# Patient Record
Sex: MALE | Race: WHITE | Employment: FULL TIME | URBAN - METROPOLITAN AREA
[De-identification: names, ages, dates, MRNs, and addresses within clinical notes are randomized per-mention and may not be internally consistent; named-entity substitution may affect disease eponyms.]

---

## 2024-01-12 ENCOUNTER — OFFICE VISIT (OUTPATIENT)
Dept: ORTHOPEDIC SURGERY | Age: 64
End: 2024-01-12

## 2024-01-12 VITALS — BODY MASS INDEX: 25.9 KG/M2 | WEIGHT: 185 LBS | HEIGHT: 71 IN

## 2024-01-12 DIAGNOSIS — M25.511 RIGHT SHOULDER PAIN, UNSPECIFIED CHRONICITY: Primary | ICD-10-CM

## 2024-01-12 DIAGNOSIS — M25.811 SHOULDER IMPINGEMENT, RIGHT: ICD-10-CM

## 2024-01-12 RX ORDER — LIDOCAINE HYDROCHLORIDE 10 MG/ML
5 INJECTION, SOLUTION INFILTRATION; PERINEURAL ONCE
Status: COMPLETED | OUTPATIENT
Start: 2024-01-12 | End: 2024-01-12

## 2024-01-12 RX ORDER — BUPIVACAINE HYDROCHLORIDE 2.5 MG/ML
30 INJECTION, SOLUTION INFILTRATION; PERINEURAL ONCE
Status: COMPLETED | OUTPATIENT
Start: 2024-01-12 | End: 2024-01-12

## 2024-01-12 RX ORDER — TRIAMCINOLONE ACETONIDE 40 MG/ML
40 INJECTION, SUSPENSION INTRA-ARTICULAR; INTRAMUSCULAR ONCE
Status: COMPLETED | OUTPATIENT
Start: 2024-01-12 | End: 2024-01-12

## 2024-01-12 RX ADMIN — BUPIVACAINE HYDROCHLORIDE 75 MG: 2.5 INJECTION, SOLUTION INFILTRATION; PERINEURAL at 09:32

## 2024-01-12 RX ADMIN — LIDOCAINE HYDROCHLORIDE 5 ML: 10 INJECTION, SOLUTION INFILTRATION; PERINEURAL at 09:33

## 2024-01-12 RX ADMIN — TRIAMCINOLONE ACETONIDE 40 MG: 40 INJECTION, SUSPENSION INTRA-ARTICULAR; INTRAMUSCULAR at 09:33

## 2024-01-12 NOTE — PROGRESS NOTES
Dr Joe Elliott      Date /Time 1/12/2024             9:29 AM EST  Name Dylon Lee             1960   [facility]  MRN [id number]                Chief Complaint   Patient presents with    Shoulder Pain     N Right Shoulder         History of Present Illness    Dylon Lee is a 63 y.o. male who presents with  right Shoulder pain.    Sent in consultation by Unknown, Provider, DO, .      Occupation:   with heavy lifting  Occupational activities: heavy lifting.  Athletic/exercise activity: Weightlifting.  Injury Mechanism:  none.  Worker's Comp. & legal issues:   none.  Previous Treatments: Ice, Heat, and NSAIDs    Patient presents the office today for new problem.  Patient here with a chief complaint of right shoulder pain.  Patient's right shoulder has been painful for a couple of months.  No specific injury or trauma.  Pain mostly concentrated lateral shoulder.  Increased pain and weakness with overhead activities.  He has had a previous arthroscopic surgery in 95 for impingement without rotator cuff tear.    Past Medical History  No past medical history on file.  Past Surgical History:   Procedure Laterality Date    KNEE SURGERY      SHOULDER SURGERY       Social History     Tobacco Use    Smoking status: Never    Smokeless tobacco: Not on file   Substance Use Topics    Alcohol use: Not on file      Current Outpatient Medications on File Prior to Visit   Medication Sig Dispense Refill    ibuprofen (ADVIL;MOTRIN) 200 MG tablet Take 600 mg by mouth every 6 hours as needed for Pain.       No current facility-administered medications on file prior to visit.        ASCVD 10-YEAR RISK SCORE  The ASCVD Risk score (Gamal MANNING, et al., 2019) failed to calculate for the following reasons:    The systolic blood pressure is missing    Cannot find a previous HDL lab    Cannot find a previous total cholesterol lab     Review of Systems  10-point ROS is negative other than 
Opt out

## 2024-01-17 ENCOUNTER — HOSPITAL ENCOUNTER (OUTPATIENT)
Dept: PHYSICAL THERAPY | Age: 64
Setting detail: THERAPIES SERIES
Discharge: HOME | End: 2024-01-17
Attending: ORTHOPAEDIC SURGERY
Payer: COMMERCIAL

## 2024-01-17 PROCEDURE — 97110 THERAPEUTIC EXERCISES: CPT

## 2024-01-17 PROCEDURE — 97140 MANUAL THERAPY 1/> REGIONS: CPT

## 2024-01-17 PROCEDURE — 97161 PT EVAL LOW COMPLEX 20 MIN: CPT

## 2024-01-17 NOTE — THERAPY EVALUATION
None Assessed   [] Following tests noted:    Emanuel Marquez (impingement): Positive on R  Speed's test (LHB): Positive on R  Lateral Anne test: Positive on R     Balance:  [x] WNL      [] NT       [] Dysfunction noted  Comment:     Falls Risk Assessment (30 days):   Falls Risk assessed and no intervention required.  Time Up and Go (TUG):   Not Assessed     ASSESSMENT   Assessment:   Dylon Lee is a 63 y.o. male presenting today to Outpatient PT with signs and symptoms consistent with R shoulder impingement. Patient displays decreased ROM with Elbow ER and IR at 90 degrees, and decreases in R global shoulder strength. Patient is TTP at R posterolateral shoulder. Patient also dispalys tightness in R pectorals and shoulder external rotators.   Patient presents with the functional impairments and activity limitations listed below and would benefit from continued Outpatient PT to address the below impairments as well as improve pain, and restore function.     Functional Impairments:   Decreased UE functional ROM  Decreased UE functional strength  Decreased RC/scapular/core strength and neuromuscular control    Functional Activity Limitations (from functional questionnaire and intake):  Any of your usual work, housework, or school activities and Pushing up on your hands (eg, from bathtub or chair)    Participation Restrictions:   Reduced participation in work activities  Reduced participation in social activities  Reduced participation in sports/recreation    Classification :   Signs/symptoms consistent with shoulder impingement          Barriers to/and or personal factors that will affect rehab potential:   None noted    Prognosis/Rehab Potential:  [] Excellent     [x] Good     [] Fair     [] Poor         Tolerance of evaluation/treatment:   [] Excellent     [x] Good     [] Fair     [] Poor    Physical Therapy Evaluation Complexity Justification  [x] A history of present problem and no personal factors and/or

## 2024-01-17 NOTE — FLOWSHEET NOTE
ER, IR, and ABD)  15                          Modalities:    No modalities applied this session    Education/Home Exercise Program: Patient HEP program created electronically.  Refer to [employer] access code:    [email or web address]       ASSESSMENT     Today's Assessment: See Eval    Medical Necessity Documentation:  I certify that this patient meets the below criteria necessary for medical necessity for care and/or justification of therapy services:  The patient has functional impairments and/or activity limitations and would benefit from continued outpatient therapy services to address the deficits outlined in the patients goals    Treatment/Activity Tolerance:  [x] Patient tolerated treatment well [] Patient limited by fatique  [] Patient limited by pain  [] Patient limited by other medical complications  [] Other:     Return to Play: NA    Prognosis for POC: [x] Good [] Fair  [] Poor    Patient requires continued skilled intervention: [x] Yes  [] No      GOALS      Patient stated goal: 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   Status: [] Progressing: [] Met: [x] Not Met: [] Adjusted     Therapist goals for Patient:   Short Term Goals: To be achieved in: 2 weeks  Independent in HEP and progression per patient tolerance, in order to progress toward full function and prevent re-injury.               Status: [] Progressing: [] Met: [x] Not Met: [] Adjusted  Patient will have a decrease in pain to 2/10 to help  facilitate improvement in movement, function, and ADLs as indicated by functional deficits.              Status: [] Progressing: [] Met: [x] Not Met: [] Adjusted     Long Term Goals: To be achieved in: 4-6weeks  Improve ROM to WNL to allow for proper joint functioning as indicated by patients functional deficits.  Status: [] Progressing: [] Met: [x] Not Met: [] Adjusted  Pt to improve strength to 4+/5 or better of rotator cuff and scapular retractors to allow for proper muscle and joint use in

## 2024-01-23 ENCOUNTER — HOSPITAL ENCOUNTER (OUTPATIENT)
Dept: PHYSICAL THERAPY | Age: 64
Setting detail: THERAPIES SERIES
Discharge: HOME | End: 2024-01-23
Attending: ORTHOPAEDIC SURGERY
Payer: COMMERCIAL

## 2024-01-23 PROCEDURE — 97140 MANUAL THERAPY 1/> REGIONS: CPT

## 2024-01-23 PROCEDURE — 97110 THERAPEUTIC EXERCISES: CPT

## 2024-01-30 ENCOUNTER — HOSPITAL ENCOUNTER (OUTPATIENT)
Dept: PHYSICAL THERAPY | Age: 64
Setting detail: THERAPIES SERIES
Discharge: HOME | End: 2024-01-30
Attending: ORTHOPAEDIC SURGERY
Payer: COMMERCIAL

## 2024-01-30 PROCEDURE — 97140 MANUAL THERAPY 1/> REGIONS: CPT

## 2024-01-30 PROCEDURE — 97110 THERAPEUTIC EXERCISES: CPT

## 2024-01-30 NOTE — FLOWSHEET NOTE
[] Met: [x] Not Met: [] Adjusted  Patient will have a decrease in pain to 2/10 to help  facilitate improvement in movement, function, and ADLs as indicated by functional deficits.              Status: [] Progressing: [] Met: [x] Not Met: [] Adjusted     Long Term Goals: To be achieved in: 4-6weeks  Improve ROM to WNL to allow for proper joint functioning as indicated by patients functional deficits.  Status: [] Progressing: [] Met: [x] Not Met: [] Adjusted  Pt to improve strength to 4+/5 or better of rotator cuff and scapular retractors to allow for proper muscle and joint use in functional mobility, ADLs and prior level of function   Status: [] Progressing: [] Met: [x] Not Met: [] Adjusted  Patient will return to Reaching activities and Lifting without increased symptoms or restriction to work towards return to prior level of function.                                  Status: [] Progressing: [] Met: [x] Not Met: [] Adjusted  Patient will increase UE function to allow independence in all self-care activities.                                                                                                           Status: [] Progressing: [] Met: [x] Not Met: [] Adjusted    Overall Progression Towards Functional goals/ Treatment Progress Update:  [] Patient is progressing as expected towards functional goals listed.    [] Progression is slowed due to complexities/Impairments listed.  [] Progression has been slowed due to co-morbidities.  [x] Plan just implemented, too soon (<30days) to assess goals progression   [] Goals require adjustment due to lack of progress  [] Patient is not progressing as expected and requires additional follow up with physician  [] Other:     CHARGE CAPTURE     PT CHARGE GRID   CPT Code (TIMED) minutes # CPT Code (UNTIMED) #     Therex (57258)  30 2  EVAL:LOW (20289 - Typically 20 minutes face-to-face)     Neuromusc. Re-ed (37455)    Re-Eval (73960)     Manual (10592) 10 1  Estim Unattended

## 2024-02-06 ENCOUNTER — HOSPITAL ENCOUNTER (OUTPATIENT)
Dept: PHYSICAL THERAPY | Age: 64
Setting detail: THERAPIES SERIES
Discharge: HOME | End: 2024-02-06
Attending: ORTHOPAEDIC SURGERY
Payer: COMMERCIAL

## 2024-02-06 PROCEDURE — 97140 MANUAL THERAPY 1/> REGIONS: CPT

## 2024-02-06 PROCEDURE — 97110 THERAPEUTIC EXERCISES: CPT

## 2024-02-06 NOTE — FLOWSHEET NOTE
[facility]- Outpatient Rehabilitation and Therapy [address], [place] [address] office: [phone] fax: [phone]      Physical Therapy: TREATMENT/PROGRESS NOTE   Patient: Dylon Lee (63 y.o. male)   Treatment Date: 2024   :  1960 MRN: [id number]   Visit #: 4   Insurance Allowable Auth Needed   50 []Yes    [x]No    Insurance: Payor: AETNA / Plan: AETNA / Product Type: *No Product type* /   Insurance ID: [id number] - (Commercial)  Secondary Insurance (if applicable):    Treatment Diagnosis: M25.511  R Shoulder pain   Medical Diagnosis:    Shoulder impingement, right [M25.811]   Referring Physician: Joe Elliott MD  PCP: Unknown, Provider, DO                             Plan of care signed (Y/N):     Date of Patient follow up with Physician:      Progress Report/POC:   POC update due: (10 visits /OR AUTH LIMITS, whichever is less) 2024     (Cut and paste Precautions/Contraindications from Eval)  Latex allergy:  NO  Pacemaker:    NO  Contraindications for Manipulation: None       Preferred Language for Healthcare:   [x]English       []other:    SUBJECTIVE EXAMINATION     Patient Report/Comments: Pt states his shoulder feels about the same today, and he states he feels like his is at a plateau in terms of his shoulder getting better.      Test used Initial score  2024   Pain Summary VAS 3/10    Functional questionnaire UEFI 54/80    Other:                OBJECTIVE EXAMINATION     Observation: Patient displays decreased AROM of R shoulder.     Test measurements: see eval    Exercises/Interventions:     Therapeutic Ex (44521) / NMR re-education (93593) Sets/time Notes/Cues/Progressions   AAROM shoulder ER  and flex with cane in supine  3x10 each     Shoulder ABD,ER,Ext,Flex isometric at wall  2x10 with 3 second hold     Shoulder ER/IR  2x10 each  GTB, pt cued on sequencing    Prone I,Y,T  3x10 each  Pt cued on sequencing, and to complete with

## 2024-02-14 ENCOUNTER — HOSPITAL ENCOUNTER (OUTPATIENT)
Dept: PHYSICAL THERAPY | Age: 64
Setting detail: THERAPIES SERIES
Discharge: HOME | End: 2024-02-14
Attending: ORTHOPAEDIC SURGERY
Payer: COMMERCIAL

## 2024-02-14 PROCEDURE — 97110 THERAPEUTIC EXERCISES: CPT

## 2024-02-14 PROCEDURE — 20560 NDL INSJ W/O NJX 1 OR 2 MUSC: CPT

## 2024-02-14 PROCEDURE — 97140 MANUAL THERAPY 1/> REGIONS: CPT

## 2024-02-14 NOTE — FLOWSHEET NOTE
[facility]- Outpatient Rehabilitation and Therapy [address], [place] [address] office: [phone] fax: [phone]      Physical Therapy: TREATMENT/PROGRESS NOTE   Patient: Dylon Lee (63 y.o. male)   Treatment Date: 2024   :  1960 MRN: [id number]   Visit #: 5   Insurance Allowable Auth Needed   50 []Yes    [x]No    Insurance: Payor: AETNA / Plan: AETNA / Product Type: *No Product type* /   Insurance ID: [id number] - (Commercial)  Secondary Insurance (if applicable):    Treatment Diagnosis: M25.511  R Shoulder pain   Medical Diagnosis:    Shoulder impingement, right [M25.811]   Referring Physician: Joe Elliott MD  PCP: Unknown, Provider, DO                             Plan of care signed (Y/N):     Date of Patient follow up with Physician:      Progress Report/POC:   POC update due: (10 visits /OR AUTH LIMITS, whichever is less) 2024     (Cut and paste Precautions/Contraindications from Eval)  Latex allergy:  NO  Pacemaker:    NO  Contraindications for Manipulation: None       Preferred Language for Healthcare:   [x]English       []other:    SUBJECTIVE EXAMINATION     Patient Report/Comments: Pt states his shoulder feels about the same today, and he states he feels like his is at a plateau in terms of his shoulder getting better.      Test used Initial score  2024   Pain Summary VAS 3/10    Functional questionnaire UEFI 54/80    Other:                OBJECTIVE EXAMINATION     Observation: Patient displays decreased AROM of R shoulder.     Test measurements: see eval    Exercises/Interventions:     Therapeutic Ex (80728) / NMR re-education (14826) Sets/time Notes/Cues/Progressions   AAROM shoulder ER  and flex with cane in supine  3x10 each     Shoulder ABD,ER,Ext isometric at wall  2x10 with 3 second hold     2x10 each  GTB, pt cued on sequencing    3x10 each  Pt cued on sequencing, and to complete with thumb up for Is and Ys   2x10  GTB,

## 2024-02-22 ENCOUNTER — HOSPITAL ENCOUNTER (OUTPATIENT)
Dept: PHYSICAL THERAPY | Age: 64
Setting detail: THERAPIES SERIES
Discharge: HOME | End: 2024-02-22
Attending: ORTHOPAEDIC SURGERY
Payer: COMMERCIAL

## 2024-02-22 PROCEDURE — 20560 NDL INSJ W/O NJX 1 OR 2 MUSC: CPT

## 2024-02-22 PROCEDURE — 97110 THERAPEUTIC EXERCISES: CPT

## 2024-02-22 PROCEDURE — 97140 MANUAL THERAPY 1/> REGIONS: CPT

## 2024-02-22 NOTE — FLOWSHEET NOTE
[facility]- Outpatient Rehabilitation and Therapy [address], [address], [place] [address] office: [phone] fax: [phone]      Physical Therapy: TREATMENT/PROGRESS NOTE   Patient: Dylon Lee (63 y.o. male)   Treatment Date: 2024   :  1960 MRN: [id number]   Visit #: 6   Insurance Allowable Auth Needed   50 []Yes    [x]No    Insurance: Payor: AETNA / Plan: AETNA / Product Type: *No Product type* /   Insurance ID: [id number] - (Commercial)  Secondary Insurance (if applicable):    Treatment Diagnosis: M25.511  R Shoulder pain   Medical Diagnosis:    Shoulder impingement, right [M25.811]   Referring Physician: Joe Elliott MD  PCP: Unknown, Provider, DO                             Plan of care signed (Y/N):     Date of Patient follow up with Physician:      Progress Report/POC:   POC update due: (10 visits /OR AUTH LIMITS, whichever is less) 2024     (Cut and paste Precautions/Contraindications from Eval)  Latex allergy:  NO  Pacemaker:    NO  Contraindications for Manipulation: None       Preferred Language for Healthcare:   [x]English       []other:    SUBJECTIVE EXAMINATION     Patient Report/Comments: Pt reports the dry needling helped him feel relief for two days, but his pain returned to previous level after that.      Test used Initial score  2024   Pain Summary VAS 3/10    Functional questionnaire UEFI 54/80    Other:                OBJECTIVE EXAMINATION     Observation: Patient displays decreased AROM of R shoulder.     Test measurements: see eval    Exercises/Interventions:     Therapeutic Ex (84965) / NMR re-education (67450) Sets/time Notes/Cues/Progressions   3x10 each     2x10 with 3 second hold     2x10 each  GTB, pt cued on sequencing    3x10 each  Pt cued on sequencing, and to complete with thumb up for Is and Ys   2x10  GTB, pt cued to keep shoulder relaxed   30x each  2# ball    10x with 3 sec hold    2x10  10# DB        Pt ED: POC,

## 2024-02-29 ENCOUNTER — TELEPHONE (OUTPATIENT)
Dept: ORTHOPEDIC SURGERY | Age: 64
End: 2024-02-29

## 2024-02-29 ENCOUNTER — OFFICE VISIT (OUTPATIENT)
Dept: ORTHOPEDIC SURGERY | Age: 64
End: 2024-02-29
Payer: COMMERCIAL

## 2024-02-29 VITALS — BODY MASS INDEX: 25.9 KG/M2 | HEIGHT: 71 IN | WEIGHT: 185 LBS

## 2024-02-29 DIAGNOSIS — M25.811 SHOULDER IMPINGEMENT, RIGHT: Primary | ICD-10-CM

## 2024-02-29 DIAGNOSIS — M25.511 RIGHT SHOULDER PAIN, UNSPECIFIED CHRONICITY: ICD-10-CM

## 2024-02-29 PROCEDURE — 99213 OFFICE O/P EST LOW 20 MIN: CPT | Performed by: ORTHOPAEDIC SURGERY

## 2024-02-29 RX ORDER — UBIDECARENONE 100 MG
200 CAPSULE ORAL DAILY
COMMUNITY

## 2024-02-29 RX ORDER — SILDENAFIL CITRATE 20 MG/1
TABLET ORAL
COMMUNITY
Start: 2023-12-21

## 2024-02-29 RX ORDER — ROSUVASTATIN CALCIUM 20 MG/1
20 TABLET, COATED ORAL EVERY EVENING
COMMUNITY
Start: 2024-01-26 | End: 2024-07-24

## 2024-02-29 NOTE — TELEPHONE ENCOUNTER
MRI RIGHT SHOULDER no precert required due to [facility] and [facility] are both out of network for this patient.   Called Provider Services and spoke with Naya and she was not able to come up with any facilities that are in network for this patient to have MRI done at.  Patient does have out of network benefits of 55% covered after deductible has been met.  So far $0 has been met of his $1,600.00 deductible.     If patient is able to find a facility that is in network we can try and precert it for there.  Patient would need to call his insurance and see if he can find a facility in network.       Maxine spoke with patient - (Reason above)     Patient to contact insurance company and get back  to me       Patient Called back- Per Insurance the ONLY [facility] in Network is [facility]           Updated  03/01/2024 - Only approved Facility is [facility]                                                        Patient states he has it in writing [facility] = patient forwarding email    I will share with the PA Team       03/01/2024 Updated 11:07 am   MRI RIGHT SHOULDER approved Auth# [id number]     Was approved for [facility] Jeff Mendoza   Valid 3/1/2024 - 8/28/2024     (Gretchen parada [facility] said it is a glitch in their system and that they are aware and working on it.  She even said when she saw it was [facility] that it was in network.)           murali

## 2024-02-29 NOTE — PROGRESS NOTES
[]Not tested    Instability Signs  [] All Neg  [] Not tested  [] All Neg  [] Not tested   General laxity (thumb/elbow)  []  []Not tested   []  []Not tested    Hyperabduction  []  []Not tested   []  []Not tested    Sulcus []Side   []ER    []Side   []ER       Anterior apprehension  []  []Not tested   []  []Not tested    Relocation  []   []Not tested  []  []Not tested     Imaging  Right Shoulder: [facility]  Previous Radiographs: X-rays were ordered and reviewed of the right shoulder.  3 views.  AP, scapular Y, and axillary views.  They demonstrate no evidence of fractures or dislocations with well-maintained joint space.        Procedure:  No orders of the defined types were placed in this encounter.        Assessment and Plan  Dylon was seen today for shoulder pain.    Diagnoses and all orders for this visit:    Shoulder impingement, right    Right shoulder pain, unspecified chronicity          Patient is suffering with right shoulder impingement and rotator cuff tendinitis.  The injection and physical therapy did not help.  Patient will be ordered an MRI to evaluate for full-thickness rotator cuff tear.  Follow-up in office after MRI    I discussed with Dylon Rosa that his history, symptoms, signs, and imaging are most consistent with shoulder impingement.    We reviewed the natural history of these conditions and treatment options ranging from conservative measures (rest, icing, activity modification, physical therapy, pain meds, cortisone injection) to surgical options.     In terms of treatment, I recommended continuing with rest, icing, avoidance of painful activities, NSAIDs or pain meds as tolerated, and physical therapy.     If these are not effective, cortisone injection can be considered.  We discussed surgical options as well, should conservative measures fail.     Electronically signed by Joe Elliott MD on 2/29/2024 at 9:17 AM  This dictation was generated by voice recognition computer

## 2024-03-01 ENCOUNTER — TELEPHONE (OUTPATIENT)
Dept: ORTHOPEDIC SURGERY | Age: 64
End: 2024-03-01

## 2024-03-01 NOTE — TELEPHONE ENCOUNTER
MRI RIGHT SHOULDER approved Auth# [id number]     Was approved for [facility] Jeff Mendoza   Valid 3/1/2024 - 8/28/2024     [facility]/ Patient/ Follow up in Office to review     jm

## 2024-03-12 ENCOUNTER — OFFICE VISIT (OUTPATIENT)
Dept: ORTHOPEDIC SURGERY | Age: 64
End: 2024-03-12

## 2024-03-12 VITALS — HEIGHT: 71 IN | BODY MASS INDEX: 25.9 KG/M2 | WEIGHT: 185 LBS

## 2024-03-12 DIAGNOSIS — M75.01 ADHESIVE CAPSULITIS OF RIGHT SHOULDER: Primary | ICD-10-CM

## 2024-03-12 RX ORDER — LIDOCAINE HYDROCHLORIDE 10 MG/ML
5 INJECTION, SOLUTION INFILTRATION; PERINEURAL ONCE
Status: COMPLETED | OUTPATIENT
Start: 2024-03-12 | End: 2024-03-12

## 2024-03-12 RX ORDER — BUPIVACAINE HYDROCHLORIDE 2.5 MG/ML
30 INJECTION, SOLUTION INFILTRATION; PERINEURAL ONCE
Status: COMPLETED | OUTPATIENT
Start: 2024-03-12 | End: 2024-03-12

## 2024-03-12 RX ORDER — TRIAMCINOLONE ACETONIDE 40 MG/ML
40 INJECTION, SUSPENSION INTRA-ARTICULAR; INTRAMUSCULAR ONCE
Status: COMPLETED | OUTPATIENT
Start: 2024-03-12 | End: 2024-03-12

## 2024-03-12 RX ADMIN — LIDOCAINE HYDROCHLORIDE 5 ML: 10 INJECTION, SOLUTION INFILTRATION; PERINEURAL at 10:19

## 2024-03-12 RX ADMIN — BUPIVACAINE HYDROCHLORIDE 75 MG: 2.5 INJECTION, SOLUTION INFILTRATION; PERINEURAL at 10:19

## 2024-03-12 RX ADMIN — TRIAMCINOLONE ACETONIDE 40 MG: 40 INJECTION, SUSPENSION INTRA-ARTICULAR; INTRAMUSCULAR at 10:20

## 2024-03-12 NOTE — PROGRESS NOTES
bicipital tenodesis.  He wishes to wait until after the summer.  We will perform an intra-articular cortisone injection today and he will continue with his physical therapy exercises    I discussed with Dylon Lee that his history, symptoms, signs, and imaging are most consistent with shoulder impingement.    We reviewed the natural history of these conditions and treatment options ranging from conservative measures (rest, icing, activity modification, physical therapy, pain meds, cortisone injection) to surgical options.     In terms of treatment, I recommended continuing with rest, icing, avoidance of painful activities, NSAIDs or pain meds as tolerated, and physical therapy.     If these are not effective, cortisone injection can be considered.  We discussed surgical options as well, should conservative measures fail.     Electronically signed by Joe Elliott MD on 2/29/2024 at 9:17 AM  This dictation was generated by voice recognition computer software.  Although all attempts are made to edit the dictation for accuracy, there may be errors in the transcription that are not intended.

## 2024-05-28 ENCOUNTER — TELEPHONE (OUTPATIENT)
Dept: ORTHOPEDIC SURGERY | Age: 64
End: 2024-05-28

## 2024-05-28 ENCOUNTER — OFFICE VISIT (OUTPATIENT)
Dept: ORTHOPEDIC SURGERY | Age: 64
End: 2024-05-28
Payer: COMMERCIAL

## 2024-05-28 VITALS — WEIGHT: 185 LBS | BODY MASS INDEX: 25.9 KG/M2 | HEIGHT: 71 IN

## 2024-05-28 DIAGNOSIS — M25.512 LEFT SHOULDER PAIN, UNSPECIFIED CHRONICITY: Primary | ICD-10-CM

## 2024-05-28 DIAGNOSIS — M75.22 BICIPITAL TENDINITIS OF LEFT SHOULDER: ICD-10-CM

## 2024-05-28 DIAGNOSIS — M25.812 SHOULDER IMPINGEMENT, LEFT: ICD-10-CM

## 2024-05-28 PROCEDURE — 20610 DRAIN/INJ JOINT/BURSA W/O US: CPT | Performed by: ORTHOPAEDIC SURGERY

## 2024-05-28 PROCEDURE — 99213 OFFICE O/P EST LOW 20 MIN: CPT | Performed by: ORTHOPAEDIC SURGERY

## 2024-05-28 RX ORDER — BUPIVACAINE HYDROCHLORIDE 2.5 MG/ML
30 INJECTION, SOLUTION INFILTRATION; PERINEURAL ONCE
Status: COMPLETED | OUTPATIENT
Start: 2024-05-28 | End: 2024-05-28

## 2024-05-28 RX ORDER — LIDOCAINE HYDROCHLORIDE 10 MG/ML
5 INJECTION, SOLUTION INFILTRATION; PERINEURAL ONCE
Status: COMPLETED | OUTPATIENT
Start: 2024-05-28 | End: 2024-05-28

## 2024-05-28 RX ORDER — TRIAMCINOLONE ACETONIDE 40 MG/ML
40 INJECTION, SUSPENSION INTRA-ARTICULAR; INTRAMUSCULAR ONCE
Status: COMPLETED | OUTPATIENT
Start: 2024-05-28 | End: 2024-05-28

## 2024-05-28 RX ADMIN — BUPIVACAINE HYDROCHLORIDE 75 MG: 2.5 INJECTION, SOLUTION INFILTRATION; PERINEURAL at 09:59

## 2024-05-28 RX ADMIN — TRIAMCINOLONE ACETONIDE 40 MG: 40 INJECTION, SUSPENSION INTRA-ARTICULAR; INTRAMUSCULAR at 10:00

## 2024-05-28 RX ADMIN — LIDOCAINE HYDROCHLORIDE 5 ML: 10 INJECTION, SOLUTION INFILTRATION; PERINEURAL at 10:00

## 2024-05-28 NOTE — PROGRESS NOTES
(Gamal MANNING, et al., 2019) failed to calculate for the following reasons:    The systolic blood pressure is missing    Cannot find a previous HDL lab    Cannot find a previous total cholesterol lab     Review of Systems  10-point ROS is negative other than HPI.    Physical Exam  Based off 1997 Exam Criteria  Ht 1.803 m (5' 11\")   Wt 83.9 kg (185 lb)   BMI 25.80 kg/m²      Constitutional:       General: He is not in acute distress.     Appearance: Normal appearance.   Cardiovascular:      Rate and Rhythm: Normal rate and regular rhythm.      Pulses: Normal pulses.   Pulmonary:      Effort: Pulmonary effort is normal. No respiratory distress.   Neurological:      Mental Status: He is alert and oriented to person, place, and time. Mental status is at baseline.   Skin: Clean dry and intact.  No open wounds or sores  Lymphatics: No palpable lymph node    Musculoskeletal:  Gait:  normal    Cervical Spine / Shoulder:      RIGHT  LEFT    Cervical Spine Exam  [x] All Neg    [x] All Neg     Spurling's  []  []Not tested   []  []Not tested    Kaye's  []  []Not tested   []  []Not tested    Pain with rotation  []  []Not tested   []  []Not tested    Pain with lateral bending  []  []Not tested   []  []Not tested    Paraspinal muscle tenderness  [] Paraspinal  []Midline   [] Paraspinal  []Not tested    Sensation RIGHT  LEFT    Axillary  [x] Normal []Decreased    [x] Normal []Decreased   Musculocutaneous  [x] Normal  []Decreased   [x] Normal []Decreased   Median  [x] Normal []Decreased   [x] Normal []Decreased   Radial  [x] Normal  []Decreased   [x] Normal []Decreased   Ulnar  [x] Normal  []Decreased   [x] Normal []Decreased   Scapula       Position  [x]Nml  []low  [] lateral  [x]Nml  []low  [] lateral   Dyskinesia  []+ []Abn.Shrug   []+ []Abn.Shrug                     Winging     [x]None   []Med  []Lat   []Worse w/FE  []Med  []Lat  []Worse w/FE   Scapulothoracic Compress.   []Impr Pain  []Impr Motion  []Impr Pain []Impr Motion

## 2024-06-20 ENCOUNTER — OFFICE VISIT (OUTPATIENT)
Dept: ORTHOPEDIC SURGERY | Age: 64
End: 2024-06-20

## 2024-06-20 VITALS — WEIGHT: 185 LBS | BODY MASS INDEX: 25.9 KG/M2 | HEIGHT: 71 IN

## 2024-06-20 DIAGNOSIS — M75.01 ADHESIVE CAPSULITIS OF RIGHT SHOULDER: Primary | ICD-10-CM

## 2024-06-20 RX ORDER — TRIAMCINOLONE ACETONIDE 40 MG/ML
40 INJECTION, SUSPENSION INTRA-ARTICULAR; INTRAMUSCULAR ONCE
Status: COMPLETED | OUTPATIENT
Start: 2024-06-20 | End: 2024-06-20

## 2024-06-20 RX ORDER — BUPIVACAINE HYDROCHLORIDE 2.5 MG/ML
30 INJECTION, SOLUTION INFILTRATION; PERINEURAL ONCE
Status: COMPLETED | OUTPATIENT
Start: 2024-06-20 | End: 2024-06-20

## 2024-06-20 RX ORDER — LIDOCAINE HYDROCHLORIDE 10 MG/ML
5 INJECTION, SOLUTION INFILTRATION; PERINEURAL ONCE
Status: COMPLETED | OUTPATIENT
Start: 2024-06-20 | End: 2024-06-20

## 2024-06-20 RX ADMIN — TRIAMCINOLONE ACETONIDE 40 MG: 40 INJECTION, SUSPENSION INTRA-ARTICULAR; INTRAMUSCULAR at 08:14

## 2024-06-20 RX ADMIN — LIDOCAINE HYDROCHLORIDE 5 ML: 10 INJECTION, SOLUTION INFILTRATION; PERINEURAL at 08:14

## 2024-06-20 RX ADMIN — BUPIVACAINE HYDROCHLORIDE 75 MG: 2.5 INJECTION, SOLUTION INFILTRATION; PERINEURAL at 08:12

## 2024-06-20 NOTE — PROGRESS NOTES
Dr Joe Elliott      Date /Time 2/29/2024             9:29 AM EST  Name Dylon Lee             1960   Location  [facility]  MRN [id number]                Chief Complaint   Patient presents with    Shoulder Pain     CK RIGHT SHOULDER - CORTISONE 1/12/24        History of Present Illness    Dylon Lee is a 63 y.o. male who presents with  right Shoulder pain.      Occupation:   with heavy lifting  Occupational activities: heavy lifting.  Athletic/exercise activity: Weightlifting.  Injury Mechanism:  none.  Worker's Comp. & legal issues:   none.  Previous Treatments: Ice, Heat, and NSAIDs    Patient here for follow-up visit.  Patient here concerning right shoulder.  Patient did receive a cortisone injection intra-articular in February.  Patient being treated for combination of rotator cuff tendinitis, impingement, and frozen shoulder.  He is also started complaining of all over pain and may have more of an autoimmune disorder going on.  He has an upcoming appointment with his primary care physician to discuss it.    Previous history: Patient presents the office today for a follow-up visit.  Patient being treated for right shoulder impingement and rotator cuff tendinitis.  Patient was given a subacromial cortisone injection and physical therapy.  He has not done well.  His pain is continued.  He was on the states he got approximately 50-60% relief with therapy and of the subacromial cortisone injection.  We did order him an MRI at his last visit.  He does better when taking Advil and Aleve.      Previous history: Patient presents the office today for new problem.  Patient here with a chief complaint of right shoulder pain.  Patient's right shoulder has been painful for a couple of months.  No specific injury or trauma.  Pain mostly concentrated lateral shoulder.  Increased pain and weakness with overhead activities.  He has had a previous arthroscopic surgery in 95 for impingement

## 2024-10-22 ENCOUNTER — OFFICE VISIT (OUTPATIENT)
Dept: ORTHOPEDIC SURGERY | Age: 64
End: 2024-10-22
Payer: COMMERCIAL

## 2024-10-22 VITALS — HEIGHT: 71 IN | BODY MASS INDEX: 25.9 KG/M2 | WEIGHT: 185 LBS

## 2024-10-22 DIAGNOSIS — M75.22 BICIPITAL TENDINITIS OF LEFT SHOULDER: ICD-10-CM

## 2024-10-22 DIAGNOSIS — M25.812 SHOULDER IMPINGEMENT, LEFT: ICD-10-CM

## 2024-10-22 DIAGNOSIS — M25.512 LEFT SHOULDER PAIN, UNSPECIFIED CHRONICITY: Primary | ICD-10-CM

## 2024-10-22 PROCEDURE — 99213 OFFICE O/P EST LOW 20 MIN: CPT | Performed by: PHYSICIAN ASSISTANT

## 2024-10-22 PROCEDURE — 20610 DRAIN/INJ JOINT/BURSA W/O US: CPT | Performed by: PHYSICIAN ASSISTANT

## 2024-10-22 RX ORDER — TRIAMCINOLONE ACETONIDE 40 MG/ML
40 INJECTION, SUSPENSION INTRA-ARTICULAR; INTRAMUSCULAR ONCE
Status: COMPLETED | OUTPATIENT
Start: 2024-10-22 | End: 2024-10-22

## 2024-10-22 RX ORDER — BUPIVACAINE HYDROCHLORIDE 2.5 MG/ML
30 INJECTION, SOLUTION INFILTRATION; PERINEURAL ONCE
Status: COMPLETED | OUTPATIENT
Start: 2024-10-22 | End: 2024-10-22

## 2024-10-22 RX ORDER — LIDOCAINE HYDROCHLORIDE 10 MG/ML
5 INJECTION, SOLUTION INFILTRATION; PERINEURAL ONCE
Status: COMPLETED | OUTPATIENT
Start: 2024-10-22 | End: 2024-10-22

## 2024-10-22 RX ADMIN — TRIAMCINOLONE ACETONIDE 40 MG: 40 INJECTION, SUSPENSION INTRA-ARTICULAR; INTRAMUSCULAR at 14:48

## 2024-10-22 RX ADMIN — LIDOCAINE HYDROCHLORIDE 5 ML: 10 INJECTION, SOLUTION INFILTRATION; PERINEURAL at 14:47

## 2024-10-22 RX ADMIN — BUPIVACAINE HYDROCHLORIDE 75 MG: 2.5 INJECTION, SOLUTION INFILTRATION; PERINEURAL at 14:47

## 2024-10-22 NOTE — PROGRESS NOTES
Dr Joe Elliott      Date /Time 10/22/2024             9:35 AM EDT  Name Dylon Lee             1960   Location  [facility]  MRN [id number]                Chief Complaint   Patient presents with    Follow-up     Ck Left Shoulder (Cortisone 05/28/2024)         History of Present Illness    Dylon Lee is a 64 y.o. male who presents with  left Shoulder pain.    Sent in consultation by Unknown, Provider, .      Injury Mechanism:  none.  Worker's Comp. & legal issues:   none.  Previous Treatments: Ice, Heat, and NSAIDs    Patient presents to the office today for follow-up visit.  Patient being treated for left shoulder impingement and bicipital tendinitis.  Patient did receive an intra-articular cortisone injection almost 5 months ago with good relief until recently.  His pain has returned.  No new injury or trauma.  Pain concentrated anterior and lateral.  I did ask him to see a rheumatologist at his last visit due to multiple joint pain and he has not seen one yet.    Previous history: Patient presents to the office today for a new problem.  Patient is here with a chief complaint of left shoulder pain.  We have seen him for his right shoulder in the past and states that the left shoulder feels similar.  The left shoulder is painful mostly anterior and lateral.  Increased pain with overhead activities.  No specific injury or trauma.    In terms of the right shoulder did present fairly similar it first.  The subacromial injection really was not that helpful and intra-articular injection was more beneficial to him    Past Medical History  History reviewed. No pertinent past medical history.  Past Surgical History:   Procedure Laterality Date    KNEE SURGERY      SHOULDER SURGERY       Social History     Tobacco Use    Smoking status: Never    Smokeless tobacco: Not on file   Substance Use Topics    Alcohol use: Not on file      Current Outpatient Medications on File Prior to Visit   Medication Sig

## 2024-11-25 ENCOUNTER — OFFICE VISIT (OUTPATIENT)
Dept: ORTHOPEDIC SURGERY | Age: 64
End: 2024-11-25

## 2024-11-25 ENCOUNTER — TELEPHONE (OUTPATIENT)
Dept: ORTHOPEDIC SURGERY | Age: 64
End: 2024-11-25

## 2024-11-25 VITALS — BODY MASS INDEX: 25.9 KG/M2 | WEIGHT: 185 LBS | HEIGHT: 71 IN

## 2024-11-25 DIAGNOSIS — M75.01 ADHESIVE CAPSULITIS OF RIGHT SHOULDER: Primary | ICD-10-CM

## 2024-11-25 DIAGNOSIS — M25.812 SHOULDER IMPINGEMENT, LEFT: ICD-10-CM

## 2024-11-25 DIAGNOSIS — M25.811 SHOULDER IMPINGEMENT, RIGHT: ICD-10-CM

## 2024-11-25 RX ORDER — TRIAMCINOLONE ACETONIDE 40 MG/ML
40 INJECTION, SUSPENSION INTRA-ARTICULAR; INTRAMUSCULAR ONCE
Status: COMPLETED | OUTPATIENT
Start: 2024-11-25 | End: 2024-11-25

## 2024-11-25 RX ORDER — LIDOCAINE HYDROCHLORIDE 10 MG/ML
5 INJECTION, SOLUTION INFILTRATION; PERINEURAL ONCE
Status: COMPLETED | OUTPATIENT
Start: 2024-11-25 | End: 2024-11-25

## 2024-11-25 RX ORDER — BUPIVACAINE HYDROCHLORIDE 2.5 MG/ML
30 INJECTION, SOLUTION INFILTRATION; PERINEURAL ONCE
Status: COMPLETED | OUTPATIENT
Start: 2024-11-25 | End: 2024-11-25

## 2024-11-25 RX ADMIN — TRIAMCINOLONE ACETONIDE 40 MG: 40 INJECTION, SUSPENSION INTRA-ARTICULAR; INTRAMUSCULAR at 13:05

## 2024-11-25 RX ADMIN — LIDOCAINE HYDROCHLORIDE 5 ML: 10 INJECTION, SOLUTION INFILTRATION; PERINEURAL at 13:05

## 2024-11-25 RX ADMIN — BUPIVACAINE HYDROCHLORIDE 75 MG: 2.5 INJECTION, SOLUTION INFILTRATION; PERINEURAL at 13:04

## 2024-11-25 NOTE — PROGRESS NOTES
Dr Joe Elliott      Date /Time 11/25/2024             9:29 AM EST  Name Dylon Lee             1960   Location  [facility]  MRN [id number]                Chief Complaint   Patient presents with    Follow-up     Ck Right Shoulder (Cortisone 03/12/2024)         History of Present Illness    Dylon Lee is a 64 y.o. male who presents with  right Shoulder pain.      Occupation:   with heavy lifting  Occupational activities: heavy lifting.  Athletic/exercise activity: Weightlifting.  Injury Mechanism:  none.  Worker's Comp. & legal issues:   none.  Previous Treatments: Ice, Heat, and NSAIDs    Patient presents the office today for a follow-up visit.  Patient being treated for right shoulder impingement and rotator cuff tendinitis.  Patient was given a subacromial cortisone injection and physical therapy.  He has not done well.  His pain is continued.  He was on the states he got approximately 50-60% relief with therapy and of the subacromial cortisone injection.  We did order him an MRI at his last visit.  He does better when taking Advil and Aleve.      Previous history: Patient presents the office today for new problem.  Patient here with a chief complaint of right shoulder pain.  Patient's right shoulder has been painful for a couple of months.  No specific injury or trauma.  Pain mostly concentrated lateral shoulder.  Increased pain and weakness with overhead activities.  He has had a previous arthroscopic surgery in 89 for impingement without rotator cuff tear.    Past Medical History  History reviewed. No pertinent past medical history.  Past Surgical History:   Procedure Laterality Date    KNEE SURGERY      SHOULDER SURGERY       Social History     Tobacco Use    Smoking status: Never    Smokeless tobacco: Not on file   Substance Use Topics    Alcohol use: Not on file      Current Outpatient Medications on File Prior to Visit   Medication Sig Dispense Refill